# Patient Record
Sex: FEMALE | Race: WHITE | NOT HISPANIC OR LATINO | Employment: FULL TIME | URBAN - METROPOLITAN AREA
[De-identification: names, ages, dates, MRNs, and addresses within clinical notes are randomized per-mention and may not be internally consistent; named-entity substitution may affect disease eponyms.]

---

## 2017-08-16 ENCOUNTER — TRANSCRIBE ORDERS (OUTPATIENT)
Dept: URGENT CARE | Age: 57
End: 2017-08-16

## 2017-08-16 ENCOUNTER — APPOINTMENT (OUTPATIENT)
Dept: RADIOLOGY | Age: 57
End: 2017-08-16
Attending: PHYSICIAN ASSISTANT
Payer: COMMERCIAL

## 2017-08-16 ENCOUNTER — OFFICE VISIT (OUTPATIENT)
Dept: URGENT CARE | Age: 57
End: 2017-08-16
Payer: COMMERCIAL

## 2017-08-16 DIAGNOSIS — M25.561 PAIN IN RIGHT KNEE: ICD-10-CM

## 2017-08-16 PROCEDURE — G0382 LEV 3 HOSP TYPE B ED VISIT: HCPCS | Performed by: FAMILY MEDICINE

## 2017-08-16 PROCEDURE — 73564 X-RAY EXAM KNEE 4 OR MORE: CPT

## 2017-08-16 PROCEDURE — 29530 STRAPPING OF KNEE: CPT | Performed by: FAMILY MEDICINE

## 2018-08-02 ENCOUNTER — ANESTHESIA EVENT (OUTPATIENT)
Dept: GASTROENTEROLOGY | Facility: HOSPITAL | Age: 58
End: 2018-08-02
Payer: COMMERCIAL

## 2018-08-02 NOTE — ANESTHESIA PREPROCEDURE EVALUATION
Review of Systems/Medical History  Patient summary reviewed  Chart reviewed  No history of anesthetic complications     Cardiovascular   Pulmonary  Smoker (1 1/2 ppd, smoked this am) cigarette smoker  ,        GI/Hepatic    GERD (Occasional) , Liver disease (Fatty liver) ,   Comment: Rectal bleeding  Hx polyps  Daily ETOH     Kidney stones,        Endo/Other  Negative endo/other ROS      GYN  Negative gynecology ROS          Hematology   Musculoskeletal  Osteoarthritis,        Neurology  Negative neurology ROS      Psychology   Anxiety, No depression ,              Physical Exam    Airway    Mallampati score: II  TM Distance: >3 FB       Dental   No notable dental hx     Cardiovascular  Rhythm: regular,     Pulmonary  Breath sounds clear to auscultation,     Other Findings        Anesthesia Plan  ASA Score- 2     Anesthesia Type- IV sedation with anesthesia with ASA Monitors  Additional Monitors:   Airway Plan:         Plan Factors-    Induction- intravenous  Postoperative Plan-     Informed Consent- Anesthetic plan and risks discussed with patient  I personally reviewed this patient with the CRNA  Discussed and agreed on the Anesthesia Plan with the CRNA  Ashley Givens

## 2018-08-03 ENCOUNTER — ANESTHESIA (OUTPATIENT)
Dept: GASTROENTEROLOGY | Facility: HOSPITAL | Age: 58
End: 2018-08-03
Payer: COMMERCIAL

## 2018-08-03 ENCOUNTER — HOSPITAL ENCOUNTER (OUTPATIENT)
Facility: HOSPITAL | Age: 58
Setting detail: OUTPATIENT SURGERY
Discharge: HOME | End: 2018-08-03
Attending: COLON & RECTAL SURGERY | Admitting: COLON & RECTAL SURGERY
Payer: COMMERCIAL

## 2018-08-03 VITALS
DIASTOLIC BLOOD PRESSURE: 68 MMHG | HEIGHT: 66 IN | TEMPERATURE: 98.8 F | RESPIRATION RATE: 16 BRPM | BODY MASS INDEX: 28.93 KG/M2 | SYSTOLIC BLOOD PRESSURE: 122 MMHG | HEART RATE: 89 BPM | WEIGHT: 180 LBS | OXYGEN SATURATION: 98 %

## 2018-08-03 DIAGNOSIS — K62.5 RECTAL BLEEDING: ICD-10-CM

## 2018-08-03 PROCEDURE — 88305 TISSUE EXAM BY PATHOLOGIST: CPT | Performed by: PATHOLOGY

## 2018-08-03 RX ORDER — COVID-19 ANTIGEN TEST
KIT MISCELLANEOUS AS NEEDED
COMMUNITY

## 2018-08-03 RX ORDER — PROPOFOL 10 MG/ML
INJECTION, EMULSION INTRAVENOUS CONTINUOUS PRN
Status: DISCONTINUED | OUTPATIENT
Start: 2018-08-03 | End: 2018-08-03 | Stop reason: SURG

## 2018-08-03 RX ORDER — SODIUM CHLORIDE 9 MG/ML
50 INJECTION, SOLUTION INTRAVENOUS CONTINUOUS
Status: DISCONTINUED | OUTPATIENT
Start: 2018-08-03 | End: 2018-08-03 | Stop reason: HOSPADM

## 2018-08-03 RX ORDER — IBUPROFEN 200 MG
TABLET ORAL EVERY 6 HOURS PRN
COMMUNITY

## 2018-08-03 RX ORDER — PROPOFOL 10 MG/ML
INJECTION, EMULSION INTRAVENOUS AS NEEDED
Status: DISCONTINUED | OUTPATIENT
Start: 2018-08-03 | End: 2018-08-03 | Stop reason: SURG

## 2018-08-03 RX ORDER — LIDOCAINE HYDROCHLORIDE 10 MG/ML
INJECTION, SOLUTION INFILTRATION; PERINEURAL AS NEEDED
Status: DISCONTINUED | OUTPATIENT
Start: 2018-08-03 | End: 2018-08-03 | Stop reason: SURG

## 2018-08-03 RX ORDER — FLUOXETINE HYDROCHLORIDE 40 MG/1
40 CAPSULE ORAL DAILY
COMMUNITY

## 2018-08-03 RX ADMIN — PROPOFOL 150 MG: 10 INJECTION, EMULSION INTRAVENOUS at 11:14

## 2018-08-03 RX ADMIN — SODIUM CHLORIDE 50 ML/HR: 0.9 INJECTION, SOLUTION INTRAVENOUS at 10:29

## 2018-08-03 RX ADMIN — SODIUM CHLORIDE: 0.9 INJECTION, SOLUTION INTRAVENOUS at 11:35

## 2018-08-03 RX ADMIN — LIDOCAINE HYDROCHLORIDE 100 MG: 10 INJECTION, SOLUTION INFILTRATION; PERINEURAL at 11:14

## 2018-08-03 RX ADMIN — PROPOFOL 200 MCG/KG/MIN: 10 INJECTION, EMULSION INTRAVENOUS at 11:14

## 2018-08-03 NOTE — H&P
History and Physical   Colon and Rectal Surgery   Ad Toribio 61 y o  female MRN: [id number]  Unit/Bed#: [facility] [place] Encounter: [id number]  08/03/18   11:08 AM      No chief complaint on file  History of Present Illness   HPI:  Ad Toribio is a 61 y o  female who presents with diarrhea  Historical Information   Past Medical History:   Diagnosis Date    Anxiety     Arthritis     Change in bowel habit     GERD (gastroesophageal reflux disease)     History of colonic polyps     Kidney stone     Liver disease     fatty liver     Past Surgical History:   Procedure Laterality Date    BREAST BIOPSY      COLONOSCOPY      LITHOTRIPSY         Meds/Allergies     Prescriptions Prior to Admission   Medication    FLUoxetine (PROzac) 40 MG capsule    ibuprofen (MOTRIN) 200 mg tablet    Naproxen Sodium (ALEVE) 220 MG CAPS         Current Facility-Administered Medications:     sodium chloride 0 9 % infusion, 50 mL/hr, Intravenous, Continuous, David Lara MD, Last Rate: 50 mL/hr at 08/03/18 1029, 50 mL/hr at 08/03/18 1029    No Known Allergies      Social History   History   Alcohol Use    Yes     Comment: daily     History   Drug Use No     History   Smoking Status    Current Every Day Smoker   Smokeless Tobacco    Never Used         Family History: History reviewed  No pertinent family history        Objective     Current Vitals:   Blood Pressure: 144/79 (08/03/18 1024)  Pulse: 95 (08/03/18 1024)  Temperature: 98 8 °F (37 1 °C) (08/03/18 1024)  Temp Source: Tympanic (08/03/18 1024)  Respirations: 18 (08/03/18 1024)  Height: 5' 6" (167 6 cm) (08/03/18 1024)  Weight - Scale: 81 6 kg (180 lb) (08/03/18 1024)  SpO2: 96 % (08/03/18 1024)  No intake or output data in the 24 hours ending 08/03/18 1108    Physical Exam:  General: No acute distress  Eyes: Normal   ENT: Normal   Neck: No JVD  Pulm: Normal in A&P  CV: NSR no murmur  Abdomen: Soft and normal on palpation, no mass, no tenderness, no guarding  Rectal: Normal sphincter tone, no perianal skin lesions  Extremities: Normal  Lymphatics: Normal        Lab Results: I have personally reviewed pertinent lab results  Imaging: I have personally reviewed pertinent reports  Patient was consented by myself for procedure as explained earlier with all the risks and benefits described  All questions answered  ASSESSMENT:  Jamie Barba is a 61 y o  female who presents with diarrhea        PLAN:  colonoscopy

## 2018-08-03 NOTE — OP NOTE
**** GI/ENDOSCOPY REPORT ****     PATIENT NAME: Reinier Brown ------ VISIT ID:     INTRODUCTION: Colonoscopy - A 61 female patient presents for an outpatient   Colonoscopy at [address] [place] [address]  PREVIOUS COLONOSCOPY:     INDICATIONS: Diarrhea  Screening for personal history of polyps  CONSENT:  The benefits, risks, and alternatives to the procedure were   discussed and informed consent was obtained from the patient  PREPARATION: EKG, pulse, pulse oximetry and blood pressure were monitored   throughout the procedure  The patient was identified by myself both   verbally and by visual inspection of ID band  ASA Classification: Class 2   - Patient has mild to moderate systemic disturbance that may or may not be   related to the disorder requiring surgery  Airway Assessment   Classification: Airway class 1 - Visualization of the soft palate, fauces,   uvula, and posterior pillars  MEDICATIONS: Anesthesia-check records     RECTAL EXAM: Normal sphincter tone  Large prolapsed hypertrophied anal   papilla, internal external hemorrhoids  PROCEDURE:  The endoscope was passed without difficulty through the anus   under direct visualization and advanced to the cecum, confirmed by   photographs  The quality of the preparation was  The scope was withdrawn   and the mucosa was carefully examined  The views were good  The patient's   toleration of the procedure was good  Cecal Intubation Time: 6 minutes(s)   Scope Withdrawal Time: 14 minutes(s)     FINDINGS:  A single frond-like/villous, sessile polyp, measuring between 5   and 10 mm in size, was found in the rectum  The polyp was removed by cold   biopsy polypectomy and placed in jar 6  The terminal ileum, cecum,   transverse colon, sigmoid colon, and rectum appeared to be normal       A   biopsy was taken (jar 1, jar 2, jar 3, jar 4, and jar 5)   Otherwise, the   colon appeared to be normal  Appendiceal opening identified COMPLICATIONS: There were no complications  IMPRESSIONS: A single frond-like/villous, sessile polyp found in the   rectum; removed by cold biopsy polypectomy  Normal terminal ileum, cecum,   transverse colon, sigmoid colon, and rectum  Biopsy taken  Otherwise   normal colonoscopy  RECOMMENDATIONS: Colonoscopy recommended in 3 years  Continue current   medications  Start high fiber diet  Call if any signs or symptoms of   abdominal pain, bleeding or diverticulitis  Daily: 81 mg aspirin, Vit  D,   five fruits daily, and exercise  Call if you are having any problems: Dr Quevedo [facility] [phone]  Call office to schedule int ext hemorrhoidectomy   and excision of the large, prolaped, hypertrophied, anal papilla  We will   call you with the colonic biopsy reports  ESTIMATED BLOOD LOSS:     PATHOLOGY SPECIMENS: Removed by cold biopsy polypectomy (jar 6)  Random   biopsy taken (jar 1, jar 2, jar 3, jar 4, and jar 5)  PROCEDURE CODES: Colonoscopy with biopsy cold biopsy polypectomy     ICD-9 Codes: 787 91 Diarrhea V12 72 Personal history of colonic polyps   211 4 Benign neoplasm of rectum and anal canal     ICD-10 Codes: R19 7 Diarrhea, unspecified Z86 010 Personal history of   colonic polyps K63 5 Polyp of colon     PERFORMED BY: EDITH Rivera  on 08/03/2018  Version 1, electronically signed by EDITH Rivera  on   08/03/2018 at 11:45

## 2018-08-03 NOTE — ANESTHESIA POSTPROCEDURE EVALUATION
Post-Op Assessment Note      CV Status:  Stable    Mental Status:  Alert and awake    Hydration Status:  Euvolemic    PONV Controlled:  Controlled    Airway Patency:  Patent    Post Op Vitals Reviewed: Yes          Staff: Anesthesiologist           /58 (08/03/18 1142)    Temp      Pulse 90 (08/03/18 1142)   Resp 16 (08/03/18 1141)    SpO2 94 % (08/03/18 1142)

## 2023-05-09 ENCOUNTER — CONSULT (OUTPATIENT)
Dept: HEMATOLOGY ONCOLOGY | Facility: CLINIC | Age: 63
End: 2023-05-09

## 2023-05-09 VITALS
WEIGHT: 194 LBS | HEIGHT: 65 IN | SYSTOLIC BLOOD PRESSURE: 142 MMHG | DIASTOLIC BLOOD PRESSURE: 96 MMHG | TEMPERATURE: 98.5 F | HEART RATE: 93 BPM | RESPIRATION RATE: 18 BRPM | BODY MASS INDEX: 32.32 KG/M2 | OXYGEN SATURATION: 96 %

## 2023-05-09 DIAGNOSIS — Z12.31 SCREENING MAMMOGRAM FOR BREAST CANCER: ICD-10-CM

## 2023-05-09 DIAGNOSIS — Z13.9 SCREENING DUE: ICD-10-CM

## 2023-05-09 DIAGNOSIS — D58.2 ELEVATED FERRITIN, HEMOGLOBIN, AND RED BLOOD CELL COUNT (HCC): Primary | ICD-10-CM

## 2023-05-09 DIAGNOSIS — R71.8 ELEVATED FERRITIN, HEMOGLOBIN, AND RED BLOOD CELL COUNT (HCC): Primary | ICD-10-CM

## 2023-05-09 DIAGNOSIS — F17.210 CIGARETTE SMOKER: ICD-10-CM

## 2023-05-09 DIAGNOSIS — R79.89 ELEVATED FERRITIN, HEMOGLOBIN, AND RED BLOOD CELL COUNT (HCC): Primary | ICD-10-CM

## 2023-05-09 RX ORDER — FLUOXETINE HYDROCHLORIDE 40 MG/1
40 CAPSULE ORAL
COMMUNITY

## 2023-05-09 RX ORDER — SIMVASTATIN 20 MG
1 TABLET ORAL EVERY EVENING
COMMUNITY
Start: 2023-04-05

## 2023-05-09 NOTE — PROGRESS NOTES
"  [facility] - Hematology & Medical Oncology  Outpatient Visit Encounter Note      Gina Blake 60 y o  female [id number] Date:  5/9/2023    HEMATOLOGICAL HISTORY        Clotting History Denies, older sister with blood clots pregnancy no clotting disorders   Bleeding History Denies, denies family hx   Cancer History Denies   Family Cancer History Mother breast cancer diagnosed mid 46s, paternal aunt colon cancer    H/O Blood/Plt Transfusion Denies   ETOHUse Every other day; glass of wine every other day    Supplements Probiotic    Occupation  [employer]      Cigarette smopking - 1/2ppd, since age of 14 previously 2-21/2ppd smoking hx     SUBJECTIVE      Gina Blake is a 60 y o  here for new consultation with me today  The patient is referred by Dr Tyrel Soriano and the reason for consultation is elevated hemoglobin  In review of the chart and talking with the patient, HLD, HTN, current every day smoker as above, psoriasis  Overall patient is feeling well with no acute complaints  She denies any abnormal headaches, changes in vision, chest pain, shortness of breath except with stairs at work lately not with her second story apartment, abdominal pain, nausea, vomiting, diarrhea, constipation, extremity edema, aqua genic pruritus  I have reviewed the relevant past medical, surgical, social and family history  I have also reviewed allergies and medications for this patient  Review of Systems  Review of Systems   All other systems reviewed and are negative  OBJECTIVE     Physical Exam  Vitals:    05/09/23 1349   BP: 142/96   BP Location: Left arm   Pulse: 93   Resp: 18   Temp: 98 5 °F (36 9 °C)   TempSrc: Tympanic   SpO2: 96%   Weight: 88 kg (194 lb)   Height: 5' 5\" (1 651 m)       Physical Exam  Vitals and nursing note reviewed  Eyes:      General: No scleral icterus    Cardiovascular:      Rate and Rhythm: Normal rate and " regular rhythm  Heart sounds: Normal heart sounds  Pulmonary:      Effort: Pulmonary effort is normal  No respiratory distress  Breath sounds: Normal breath sounds  Abdominal:      Palpations: Abdomen is soft  Tenderness: There is no abdominal tenderness  Musculoskeletal:      Cervical back: Neck supple  Lymphadenopathy:      Cervical: No cervical adenopathy  Skin:     General: Skin is warm and dry  Findings: Rash present  Erythema: c/w psoriasis b/l elbows  Neurological:      Mental Status: She is oriented to person, place, and time  Imaging  Relevant imaging reviewed in chart    Colonoscopy 2018, history of polyps but normal pathology  Labs  Relevant labs reviewed in chart      Component 04/05/23 02/20/18   Hemoglobin 16 8 High  16 6 High    Hematocrit 50 1 High  48 6 High    WBC 6 5 7 1   RBC 5 23 High  5 02 High    Platelet Count 965 417   MPV 8 3 8 1   MCV 96 97   MCH 32 1 33 0   MCHC 33 5 34 1   RDW 12 5 11 9 Low    Differential Type AUTO AUTO   Absolute Neutrophils 4 3 5 8   Absolute Lymphocytes 1 6 1 0   Absolute Monocytes 0 4 0 3   Absolute Eosinophils 0 2 0 1   Absolute Basophils 0 0 0 0   Neutrophils 66 80 High    Lymphocytes 24 14 Low    Monocytes 7 4 Low    Eosinophils 2 1   Basophils 1 1   Hematology Comment SEE NOTES  --   Morphology -- Not performed       4/5/2023 CMP within normal limits  ASSESSMENT & PLAN      Diagnosis ICD-10-CM Associated Orders   1  Elevated ferritin, hemoglobin, and red blood cell count (HCC)  R79 89 Erythropoietin    D58 2 CBC and differential    R71 8 Comprehensive metabolic panel      2  Screening mammogram for breast cancer  Z12 31 Mammo screening bilateral w 3d & cad      3  Screening due  Z13 9 Ambulatory referral to Gynecology     CT lung screening program      4  Cigarette smoker  F17 210 CT lung screening program            60 y o  female presenting for evaluation of polycythemia      · Discussion  · We discussed etiology of polycythemia (primary versus secondary etiologies) him highly suspicious of the patient's cigarette smoking is underlying cause currently  · We discussed work-up to include CBC, CMP, EPO levels and if EPO is elevated is likely secondary polycythemia  If normal we will have her return visit and discussion of evaluation for myeloproliferative disorders  · Patient has never had screening mammography, ordered for patient  · Patient is a candidate for CT lung screening, ordered for patient  · Dilatory referral to gynecology for routine cancer screening since patient declines prior Pap smear  · Plan/Labs  · As above    Follow Up  • 1 month with labs      All questions were answered to the patient's satisfaction during this encounter  They appreciated and thanked me for spending time with them  The patient knows the contact information for our office and know to reach out for any relevant concerns related to this encounter  For all other listed problems and medical diagnosis in his chart - they are managed by PCP and/or other specialists, which patient acknowledges          Hematology & Medical Oncology

## 2024-05-03 ENCOUNTER — HOSPITAL ENCOUNTER (OUTPATIENT)
Dept: RADIOLOGY | Age: 64
Discharge: HOME | End: 2024-05-03
Payer: COMMERCIAL

## 2024-05-03 VITALS — HEIGHT: 65 IN | BODY MASS INDEX: 32.32 KG/M2 | WEIGHT: 194 LBS

## 2024-05-03 DIAGNOSIS — Z12.31 SCREENING MAMMOGRAM FOR BREAST CANCER: ICD-10-CM

## 2024-05-03 PROCEDURE — 77067 SCR MAMMO BI INCL CAD: CPT

## 2024-05-03 PROCEDURE — 77063 BREAST TOMOSYNTHESIS BI: CPT
